# Patient Record
Sex: MALE | Race: WHITE | Employment: FULL TIME | URBAN - METROPOLITAN AREA
[De-identification: names, ages, dates, MRNs, and addresses within clinical notes are randomized per-mention and may not be internally consistent; named-entity substitution may affect disease eponyms.]

---

## 2018-08-03 ENCOUNTER — OFFICE VISIT (OUTPATIENT)
Dept: FAMILY MEDICINE CLINIC | Age: 23
End: 2018-08-03

## 2018-08-03 VITALS
HEART RATE: 67 BPM | TEMPERATURE: 96.6 F | WEIGHT: 288.1 LBS | SYSTOLIC BLOOD PRESSURE: 137 MMHG | BODY MASS INDEX: 35.08 KG/M2 | RESPIRATION RATE: 16 BRPM | DIASTOLIC BLOOD PRESSURE: 75 MMHG | OXYGEN SATURATION: 97 % | HEIGHT: 76 IN

## 2018-08-03 DIAGNOSIS — G43.109 MIGRAINE WITH AURA AND WITHOUT STATUS MIGRAINOSUS, NOT INTRACTABLE: Primary | ICD-10-CM

## 2018-08-03 DIAGNOSIS — H93.19 TINNITUS, UNSPECIFIED LATERALITY: ICD-10-CM

## 2018-08-03 DIAGNOSIS — H91.90 HEARING LOSS, UNSPECIFIED HEARING LOSS TYPE, UNSPECIFIED LATERALITY: ICD-10-CM

## 2018-08-03 PROBLEM — E66.01 SEVERE OBESITY (BMI 35.0-39.9): Status: ACTIVE | Noted: 2018-08-03

## 2018-08-03 RX ORDER — SUMATRIPTAN 50 MG/1
50 TABLET, FILM COATED ORAL
Qty: 10 TAB | Refills: 3 | Status: SHIPPED | OUTPATIENT
Start: 2018-08-03 | End: 2022-03-14 | Stop reason: SDUPTHER

## 2018-08-03 RX ORDER — NAPROXEN 500 MG/1
500 TABLET ORAL
Qty: 60 TAB | Refills: 3 | Status: SHIPPED | OUTPATIENT
Start: 2018-08-03 | End: 2022-03-14 | Stop reason: SDUPTHER

## 2018-08-03 NOTE — PROGRESS NOTES
[address] [place] [address] Visit   08/03/2018  Patient ID: Samir Hernandez is a 21 y.o. male. Assessment/Plan:      Diagnoses and all orders for this visit:    1. Migraine with aura and without status migrainosus, not intractable    2. Tinnitus, unspecified laterality  -     REFERRAL TO ENT-OTOLARYNGOLOGY    3. Hearing loss, unspecified hearing loss type, unspecified laterality  -     REFERRAL TO ENT-OTOLARYNGOLOGY    Other orders  -     naproxen (NAPROSYN) 500 mg tablet; Take 1 Tab by mouth every twelve (12) hours as needed. At first sign of migraine  -     SUMAtriptan (IMITREX) 50 mg tablet; Take 1 Tab by mouth daily as needed for Migraine. May repeat dose in 2 hours if symptoms not better. MAX in 24 hours: 200mg    Neuro exam and ear exam normal today. Start nsaid and triptan for prn use to manage occasional migraines. See ENT for hearing loss and tinnitus. See patient instructions for more. Counselled pt on Patient health concerns and plans. Patient was offered a choice/choices in the treatment plan today. Reviewed return precautions as appropriate. Patient expresses understanding of the plan and agrees with recommendations. Patient Instructions   . TODAY, please go to:  Release of records- [facility] student health- vaccine record     - prior PCP- all records    CHECK OUT - If you received a referral, Show the     Please schedule the following appointments at [facility] OUT:  · Follow up with Dr. Anca Ecsobar, rose Stephens Complete Physical Exam in Feb 2019      Today's Plan:  Take naprosyn then imitrex when needed  Avoid triggers. Call and schedule to see ENT  _____________________   Consider signing up for DepoMed to receive your results electronically. Subjective:   HPI:  Samir Hernandez is a 21 y.o. male being seen for:   Chief Complaint   Patient presents with    New Patient    Headache     History of Migraines started 1st year of high school.     Ringing in [facility] occasionally. Establish Care  Past medical history, surgical history, social history, family history, medications, allergies reviewed and updated. Prior PCP: does not remember name  Moved to the area from [place], [place]    Tinnitus  ·  usually only after very loud activities. First noticed in HS after pep rallies. Only in left ear. · Continues to recur after loud noises  · Lasts for minutes  · There is hearing loss during that time  · Temporary hearing loss about an hour a week ago on right  · There is some mild pain when he has tinnitus. 4-5/10 at worst  · No N/V.  · Does not balance problems when one ear has symptoms. Denies tinnitus  · Perhaps has this happen once a month or once every month. headaches  ·  started around summer freshman year of HS. Was playing video games, developed tunnel vision. · Starts with black spot in center of vision. Then the dark spot gets bigger, then headache usually frontal.   · No N/V. He does get sensitive to light and sound. · Headache is aching  · Improves with sleep after about 10 hours and then lingers for a day  · Triggered by dehydration, hunger, bright lights, physical activity, eye strain  · Used to be once every 6 months in college. Now about once a year. · Has tried ibuprofen 400mg w/o improvement, perhaps tylenol also. No syncope  Has had numbness on lateral left 5th digit after sleeping      Screening and Prevention Due:  Health Maintenance Due   Topic Date Due    DTaP/Tdap/Td series (1 - Tdap) 07/27/2016    Influenza Age 5 to Adult  08/01/2018         Review of Systems  Otherwise as noted in HPI    Social History     Social History Narrative    From [place], [place]     Family is [place]. He is middle son. Two biological brother and 4 step brothers. No Known Allergies  Prior to Admission medications    Medication Sig Start Date End Date Taking?  Authorizing Provider   naproxen (NAPROSYN) 500 mg tablet Take 1 Tab by mouth every twelve (12) hours as needed. At first sign of migraine 8/3/18  Yes Romy Sargent. Mariam Harris MD   SUMAtriptan (IMITREX) 50 mg tablet Take 1 Tab by mouth daily as needed for Migraine. May repeat dose in 2 hours if symptoms not better. MAX in 24 hours: 200mg 8/3/18  Yes Romy Sargent. Mariam Harris MD     Patient Active Problem List   Diagnosis Code    Severe obesity (BMI 35.0-39.9) ([facility] E66.01    Migraine with aura and without status migrainosus, not intractable G43.109     . Social History     Social History    Marital status: SINGLE     Spouse name: N/A    Number of children: N/A    Years of education: N/A     Occupational History     for [employer]      Social History Main Topics    Smoking status: Never Smoker    Smokeless tobacco: Never Used    Alcohol use 1.8 oz/week     3 Cans of beer per week    Drug use: No    Sexual activity: Yes     Partners: Female      Comment: monogamous     Other Topics Concern    Not on file     Social History Narrative    From [place] [facility]     Family is [facility] [employer]. He is middle son. Two biological brother and 4 step brothers. Past Medical History:   Diagnosis Date    Headache     Migraine with aura and without status migrainosus, not intractable 8/3/2018     History reviewed. No pertinent surgical history. Family History   Problem Relation Age of Onset    Depression Mother     Alcohol abuse Father     Depression Brother     Cancer Maternal Grandmother      Lung    No Known Problems Maternal Grandfather     No Known Problems Paternal Grandmother     No Known Problems Paternal Grandfather     No Known Problems Brother        ?   Objective:     Visit Vitals    /75 (BP 1 Location: Right arm, BP Patient Position: Sitting)    Pulse 67    Temp 96.6 °F (35.9 °C) (Oral)    Resp 16    Ht 6' 4\" (1.93 m)    Wt 288 lb 1.6 oz (130.7 kg)    SpO2 97%    BMI 35.07 kg/m2     Wt Readings from Last 3 Encounters:   08/03/18 288 lb 1.6 oz (130.7 kg)     BP Readings from Last 3 Encounters:   08/03/18 137/75     No flowsheet data found. Physical Exam   Constitutional: He appears well-developed and well-nourished. No distress. HENT:   Right Ear: Tympanic membrane and ear canal normal.   Left Ear: Tympanic membrane and ear canal normal.   Pulmonary/Chest: Effort normal. No respiratory distress. Neurological: He is alert. He has normal strength and normal reflexes. No cranial nerve deficit or sensory deficit. He exhibits normal muscle tone. He displays a negative Romberg sign. Coordination and gait normal. GCS eye subscore is 4. GCS verbal subscore is 5. GCS motor subscore is 6. Nl FTN, HTS, PAULETTE   Psychiatric: He has a normal mood and affect.  His behavior is normal.

## 2018-08-03 NOTE — MR AVS SNAPSHOT
[address] 
[address] [facility] [address] 
[phone] Patient: Kiya Sheikh MRN: [id number] :1995 Visit Information Date & Time Provider Department Dept. Phone Encounter #  
 8/3/2018  2:40 PM Marnie Goddard. Pb Ventura MD [facility] [phone] [id number] Upcoming Health Maintenance Date Due DTaP/Tdap/Td series (1 - Tdap) 2016 Influenza Age 5 to Adult 2018 Allergies as of 8/3/2018  Review Complete On: 8/3/2018 By: Marnie Goddard. Pb Ventura MD  
 No Known Allergies Current Immunizations  Never Reviewed No immunizations on file. Not reviewed this visit You Were Diagnosed With   
  
 Codes Comments Migraine with aura and without status migrainosus, not intractable    -  Primary ICD-10-CM: G43.109 ICD-9-CM: 346.00 Tinnitus, unspecified laterality     ICD-10-CM: H93.19 ICD-9-CM: 388.30 Hearing loss, unspecified hearing loss type, unspecified laterality     ICD-10-CM: H91.90 ICD-9-CM: 159. 9 Vitals BP Pulse Temp Resp Height(growth percentile) Weight(growth percentile)  
 137/75 (BP 1 Location: Right arm, BP Patient Position: Sitting) 67 96.6 °F (35.9 °C) (Oral) 16 6' 4\" (1.93 m) 288 lb 1.6 oz (130.7 kg) SpO2 BMI Smoking Status 97% 35.07 kg/m2 Never Smoker Vitals History BMI and BSA Data Body Mass Index Body Surface Area 35.07 kg/m 2 2.65 m 2 Preferred Pharmacy Pharmacy Name Phone [phone] Your Updated Medication List  
  
   
This list is accurate as of 8/3/18  4:08 PM.  Always use your most recent med list.  
  
  
  
  
 naproxen 500 mg tablet Commonly known as:  NAPROSYN Take 1 Tab by mouth every twelve (12) hours as needed. At first sign of migraine SUMAtriptan 50 mg tablet Commonly known as:  IMITREX Take 1 Tab by mouth daily as needed for Migraine. May repeat dose in 2 hours if symptoms not better. MAX in 24 hours: 200mg Prescriptions Sent to Pharmacy Refills  
 naproxen (NAPROSYN) 500 mg tablet 3 Sig: Take 1 Tab by mouth every twelve (12) hours as needed. At first sign of migraine Class: Normal  
 Pharmacy: [facility] [address] [place] [address], [address] [facility] Ph #: [phone] Route: Oral  
 SUMAtriptan (IMITREX) 50 mg tablet 3 Sig: Take 1 Tab by mouth daily as needed for Migraine. May repeat dose in 2 hours if symptoms not better. MAX in 24 hours: 200mg Class: Normal  
 Pharmacy: [facility] [address] [place] [address], [address] [facility] Ph #: [phone] Route: Oral  
  
We Performed the Following REFERRAL TO ENT-OTOLARYNGOLOGY [ORJ03 Custom] Comments:  
 Intermittent Tinnitus, hearing loss Referral Information Referral ID Referred By Referred To  
  
 [id number] [facility]   
   [address] [facility] [address] [place], [address] Visits Status Start Date End Date 1 New Request 8/3/18 8/3/19 If your referral has a status of pending review or denied, additional information will be sent to support the outcome of this decision. Patient Instructions Ana Gómez TODAY, please go to: 
Release of records- [facility] student health- vaccine record - prior PCP- all records CHECK OUT - If you received a referral, Show the  Please schedule the following appointments at [facility] OUT: 
· Follow up with Dr. Claudeen Figures, or Angie Complete Physical Exam in Feb 2019 Today's Plan: 
Take naprosyn then imitrex when needed Avoid triggers. Call and schedule to see ENT 
_____________________ Consider signing up for Dakim to receive your results electronically. Introducing [facility]!    
 Chari Hall introduces miiCard patient portal. Now you can access parts of your medical record, email your doctor's office, and request medication refills online. 1. In your internet browser, go to [email or web address] Identity Pass. [email or web address] Identity Pass 2. Click on the First Time User? Click Here link in the Sign In box. You will see the New Member Sign Up page. 3. Enter your Aposense Access Code exactly as it appears below. You will not need to use this code after youve completed the sign-up process. If you do not sign up before the expiration date, you must request a new code. · Aposense Access Code: [email or web address] Expires: 11/1/2018  4:08 PM 
 
4. Enter the last four digits of your Social Security Number (xxxx) and Date of Birth (mm/dd/yyyy) as indicated and click Submit. You will be taken to the next sign-up page. 5. Create a Aposense ID. This will be your Aposense login ID and cannot be changed, so think of one that is secure and easy to remember. 6. Create a Aposense password. You can change your password at any time. 7. Enter your Password Reset Question and Answer. This can be used at a later time if you forget your password. 8. Enter your e-mail address. You will receive e-mail notification when new information is available in [address] 9. Click Sign Up. You can now view and download portions of your medical record. 10. Click the Download Summary menu link to download a portable copy of your medical information. If you have questions, please visit the Frequently Asked Questions section of the Aposense website. Remember, Aposense is NOT to be used for urgent needs. For medical emergencies, dial 911. Now available from your iPhone and Android! Please provide this summary of care documentation to your next provider. Your primary care clinician is listed as Lizet Levin. If you have any questions after today's visit, please call [phone].

## 2018-08-03 NOTE — PROGRESS NOTES
Evelin Hayes  Identified pt with two pt identifiers(name and ). Chief Complaint   Patient presents with    New Patient    Headache     History of Migraines started 1st year of high school.  Ringing in Ear     Happens occasionally. 1. Have you been to the ER, urgent care clinic since your last visit? Hospitalized since your last visit? New patient    2. Have you seen or consulted any other health care providers outside of the [address] [place] [address] since your last visit? Include any pap smears or colon screening. New patient    Today's provider has been notified of reason for visit, vitals and flowsheets obtained on patients. Reviewed record In preparation for visit, huddled with provider and have obtained necessary documentation      Health Maintenance Due   Topic    DTaP/Tdap/Td series (1 - Tdap)    Influenza Age 5 to Adult        Wt Readings from Last 3 Encounters:   18 288 lb 1.6 oz (130.7 kg)     Temp Readings from Last 3 Encounters:   No data found for Temp     BP Readings from Last 3 Encounters:   No data found for BP     Pulse Readings from Last 3 Encounters:   No data found for Pulse     Vitals:    18 1503   Weight: 288 lb 1.6 oz (130.7 kg)   Height: 6' 4\" (1.93 m)   PainSc:   0 - No pain         Learning Assessment:  :     Learning Assessment 8/3/2018   PRIMARY LEARNER Patient   HIGHEST LEVEL OF EDUCATION - PRIMARY LEARNER  4 YEARS OF COLLEGE   BARRIERS PRIMARY LEARNER NONE   CO-LEARNER CAREGIVER No   PRIMARY LANGUAGE ENGLISH   LEARNER PREFERENCE PRIMARY READING   ANSWERED BY Patient   RELATIONSHIP SELF       Depression Screening:  :     No flowsheet data found. Fall Risk Assessment:  :     No flowsheet data found. Abuse Screening:  :     No flowsheet data found.     ADL Screening:  :     ADL Assessment 8/3/2018   Feeding yourself No Help Needed   Getting from bed to chair No Help Needed   Getting dressed No Help Needed   Bathing or showering No Help Needed   Walk across the room (includes cane/walker) No Help Needed   Using the telphone No Help Needed   Taking your medications No Help Needed   Preparing meals No Help Needed   Managing money (expenses/bills) No Help Needed   Moderately strenuous housework (laundry) No Help Needed   Shopping for personal items (toiletries/medicines) No Help Needed   Shopping for groceries No Help Needed   Driving No Help Needed   Climbing a flight of stairs No Help Needed   Getting to places beyond walking distances No Help Needed                 Medication reconciliation up to date and corrected with patient at this time.

## 2018-08-03 NOTE — PATIENT INSTRUCTIONS
April Lloyd TODAY, please go to:  Release of records- [facility] student health- vaccine record     - prior PCP- all records    CHECK OUT - If you received a referral, Show the     Please schedule the following appointments at [facility] OUT:  · Follow up with Dr. Ab Dillon, or Kat Complete Physical Exam in Feb 2019      Today's Plan:  Take naprosyn then imitrex when needed  Avoid triggers. Call and schedule to see ENT  _____________________   Consider signing up for Android App Review Sourcet to receive your results electronically.

## 2022-02-18 ENCOUNTER — OFFICE VISIT (OUTPATIENT)
Dept: INTERNAL MEDICINE CLINIC | Age: 27
End: 2022-02-18
Payer: COMMERCIAL

## 2022-02-18 VITALS
SYSTOLIC BLOOD PRESSURE: 114 MMHG | WEIGHT: 282.8 LBS | BODY MASS INDEX: 34.44 KG/M2 | OXYGEN SATURATION: 95 % | TEMPERATURE: 98.2 F | RESPIRATION RATE: 16 BRPM | DIASTOLIC BLOOD PRESSURE: 77 MMHG | HEIGHT: 76 IN | HEART RATE: 62 BPM

## 2022-02-18 DIAGNOSIS — R10.32 LEFT LOWER QUADRANT ABDOMINAL PAIN: ICD-10-CM

## 2022-02-18 DIAGNOSIS — M79.671 PAIN IN BOTH FEET: ICD-10-CM

## 2022-02-18 DIAGNOSIS — G43.109 MIGRAINE WITH AURA AND WITHOUT STATUS MIGRAINOSUS, NOT INTRACTABLE: ICD-10-CM

## 2022-02-18 DIAGNOSIS — F41.9 ANXIETY: ICD-10-CM

## 2022-02-18 DIAGNOSIS — Z00.00 PHYSICAL EXAM: Primary | ICD-10-CM

## 2022-02-18 DIAGNOSIS — R10.32 LEFT INGUINAL PAIN: ICD-10-CM

## 2022-02-18 DIAGNOSIS — E66.9 OBESITY (BMI 30.0-34.9): ICD-10-CM

## 2022-02-18 DIAGNOSIS — M79.672 PAIN IN BOTH FEET: ICD-10-CM

## 2022-02-18 PROCEDURE — 99385 PREV VISIT NEW AGE 18-39: CPT | Performed by: NURSE PRACTITIONER

## 2022-02-18 RX ORDER — SERTRALINE HYDROCHLORIDE 50 MG/1
50 TABLET, FILM COATED ORAL DAILY
Qty: 30 TABLET | Refills: 2 | Status: SHIPPED | OUTPATIENT
Start: 2022-02-18 | End: 2022-06-02

## 2022-02-18 RX ORDER — DICLOFENAC SODIUM 75 MG/1
TABLET, DELAYED RELEASE ORAL
COMMUNITY
Start: 2021-10-12 | End: 2022-03-15 | Stop reason: ALTCHOICE

## 2022-02-18 NOTE — PROGRESS NOTES
Subjective  Alena Katz is a 32 y.o. male presents to establish care   HPI     He has received COVID vaccine, booster and flu vaccine   Labs done by former PCP ~ 1.5 month ago; no abnormal findings  Pain left groin, left lower abdomen and down leg past couple of months. Diagnosed with sciatica by ortho and PT recommended before MRI. He has not started PT  No pain for a week    PCP evaluated for blood in urine and hernia, no abnormal finding   Back Xray order by PCP, normal   Pain random, non reproducible, not positional  No pain when he runs     Hx of migraine since HS. Saw ENT specialist d/t dizziness   Diagnosed with vestibular migraine   Dizziness builds and he gets migraine   Visual auras   Advised to take migraine medication when he has dizziness   Takes Naproxen, Ibuprofen and Imitrex with good relief   No neurology evaluation     Get pain bottom feet in last 6 months   Pain with standing and walking 30 minutes to 1 hour  No self treatment   Running does not cause pain     Psychosocial Hx:    since November   Blended family; 6 brothers   Anxiety \"neurotic\"   No prior treatment  for anxiety   Job is stressful   Works in [employer]  industry   No depression   Get 8 hours sleep  Occasional alcohol   No tobacco       Past Medical History:   Diagnosis Date    Headache     Migraine with aura and without status migrainosus, not intractable 8/3/2018       No Known Allergies     History reviewed. No pertinent surgical history. Current Outpatient Medications   Medication Sig    diclofenac EC (VOLTAREN) 75 mg EC tablet     sertraline (ZOLOFT) 50 mg tablet Take 1 Tablet by mouth daily.  naproxen (NAPROSYN) 500 mg tablet Take 1 Tab by mouth every twelve (12) hours as needed. At first sign of migraine    SUMAtriptan (IMITREX) 50 mg tablet Take 1 Tab by mouth daily as needed for Migraine. May repeat dose in 2 hours if symptoms not better.  MAX in 24 hours: 200mg     No current facility-administered medications for this visit.        Family History   Problem Relation Age of Onset    Depression Mother     Migraines Mother     Alcohol abuse Father     Depression Brother     Cancer Maternal Grandmother         Lung    No Known Problems Maternal Grandfather     No Known Problems Paternal Grandmother     No Known Problems Paternal Grandfather     No Known Problems Brother                  ROS    Objective  Physical Exam     Assessment & Plan  {ASSESSMENT/PLAN:99812}  Shanda Hogan, NP

## 2022-02-18 NOTE — PATIENT INSTRUCTIONS
Abdominal Pain: Care Instructions  Your Care Instructions     Abdominal pain has many possible causes. Some aren't serious and get better on their own in a few days. Others need more testing and treatment. If your pain continues or gets worse, you need to be rechecked and may need more tests to find out what is wrong. You may need surgery to correct the problem. Don't ignore new symptoms, such as fever, nausea and vomiting, urination problems, pain that gets worse, and dizziness. These may be signs of a more serious problem. Your doctor may have recommended a follow-up visit in the next 8 to 12 hours. If you are not getting better, you may need more tests or treatment. The doctor has checked you carefully, but problems can develop later. If you notice any problems or new symptoms, get medical treatment right away. Follow-up care is a key part of your treatment and safety. Be sure to make and go to all appointments, and call your doctor if you are having problems. It's also a good idea to know your test results and keep a list of the medicines you take. How can you care for yourself at home? · Rest until you feel better. · To prevent dehydration, drink plenty of fluids. Choose water and other clear liquids until you feel better. If you have kidney, heart, or liver disease and have to limit fluids, talk with your doctor before you increase the amount of fluids you drink. · If your stomach is upset, eat mild foods, such as rice, dry toast or crackers, bananas, and applesauce. Try eating several small meals instead of two or three large ones. · Wait until 48 hours after all symptoms have gone away before you have spicy foods, alcohol, and drinks that contain caffeine. · Do not eat foods that are high in fat. · Avoid anti-inflammatory medicines such as aspirin, ibuprofen (Advil, Motrin), and naproxen (Aleve). These can cause stomach upset.  Talk to your doctor if you take daily aspirin for another health problem. When should you call for help? Call 911 anytime you think you may need emergency care. For example, call if:    · You passed out (lost consciousness).     · You pass maroon or very bloody stools.     · You vomit blood or what looks like coffee grounds.     · You have new, severe belly pain. Call your doctor now or seek immediate medical care if:    · Your pain gets worse, especially if it becomes focused in one area of your belly.     · You have a new or higher fever.     · Your stools are black and look like tar, or they have streaks of blood.     · You have unexpected vaginal bleeding.     · You have symptoms of a urinary tract infection. These may include:  ? Pain when you urinate. ? Urinating more often than usual.  ? Blood in your urine.     · You are dizzy or lightheaded, or you feel like you may faint. Watch closely for changes in your health, and be sure to contact your doctor if:    · You are not getting better after 1 day (24 hours). Where can you learn more? Go to http://www.gray.com/  Enter Y477 in the search box to learn more about \"Abdominal Pain: Care Instructions. \"  Current as of: July 1, 2021               Content Version: 13.0  © 2006-2021 [employer]. Care instructions adapted under license by [employer] (which disclaims liability or warranty for this information). If you have questions about a medical condition or this instruction, always ask your healthcare professional. [place] [address] any warranty or liability for your use of this information. Body Mass Index: Care Instructions  Your Care Instructions     Body mass index (BMI) can help you see if your weight is raising your risk for health problems. It uses a formula to compare how much you weigh with how tall you are. · A BMI lower than 18.5 is considered underweight. · A BMI between 18.5 and 24.9 is considered healthy.   · A BMI between 25 and 29.9 is considered overweight. A BMI of 30 or higher is considered obese. If your BMI is in the normal range, it means that you have a lower risk for weight-related health problems. If your BMI is in the overweight or obese range, you may be at increased risk for weight-related health problems, such as high blood pressure, heart disease, stroke, arthritis or joint pain, and diabetes. If your BMI is in the underweight range, you may be at increased risk for health problems such as fatigue, lower protection (immunity) against illness, muscle loss, bone loss, hair loss, and hormone problems. BMI is just one measure of your risk for weight-related health problems. You may be at higher risk for health problems if you are not active, you eat an unhealthy diet, or you drink too much alcohol or use tobacco products. Follow-up care is a key part of your treatment and safety. Be sure to make and go to all appointments, and call your doctor if you are having problems. It's also a good idea to know your test results and keep a list of the medicines you take. How can you care for yourself at home? · Practice healthy eating habits. This includes eating plenty of fruits, vegetables, whole grains, lean protein, and low-fat dairy. · If your doctor recommends it, get more exercise. Walking is a good choice. Bit by bit, increase the amount you walk every day. Try for at least 30 minutes on most days of the week. · Do not smoke. Smoking can increase your risk for health problems. If you need help quitting, talk to your doctor about stop-smoking programs and medicines. These can increase your chances of quitting for good. · Limit alcohol to 2 drinks a day for men and 1 drink a day for women. Too much alcohol can cause health problems. If you have a BMI higher than 25  · Your doctor may do other tests to check your risk for weight-related health problems.  This may include measuring the distance around your waist. A waist measurement of more than 40 inches in men or 35 inches in women can increase the risk of weight-related health problems. · Talk with your doctor about steps you can take to stay healthy or improve your health. You may need to make lifestyle changes to lose weight and stay healthy, such as changing your diet and getting regular exercise. If you have a BMI lower than 18.5  · Your doctor may do other tests to check your risk for health problems. · Talk with your doctor about steps you can take to stay healthy or improve your health. You may need to make lifestyle changes to gain or maintain weight and stay healthy, such as getting more healthy foods in your diet and doing exercises to build muscle. Where can you learn more? Go to http://www.hurt.com/  Enter S176 in the search box to learn more about \"Body Mass Index: Care Instructions. \"  Current as of: March 17, 2021               Content Version: 13.0  © 0322-9063 [employer]. Care instructions adapted under license by [employer] (which disclaims liability or warranty for this information). If you have questions about a medical condition or this instruction, always ask your healthcare professional. [address] any warranty or liability for your use of this information. Learning About Low-Carbohydrate Diets  What is a low-carbohydrate diet? A low-carbohydrate (or \"low-carb\") diet limits foods and drinks that have carbohydrates. This includes grains, fruits, milk and yogurt, and starchy vegetables like potatoes, beans, and corn. It also avoids foods and drinks that have added sugar. Instead, low-carb diets include foods that are high in protein and fat. Why might you follow a low-carb diet? Low-carb diets may be used for a variety of reasons, such as for weight loss. People who have diabetes may use a low-carb diet to help manage their blood sugar levels.   What should you do before you start the diet? Talk to your doctor before you try any diet. This is even more important if you have health problems like kidney disease, heart disease, or diabetes. Your doctor may suggest that you meet with a registered dietitian. A dietitian can help you make an eating plan that works for you. What foods do you eat on a low-carb diet? On a low-carb diet, you choose foods that are high in protein and fat. Examples of these are:  · Meat, poultry, and fish. · Eggs. · Nuts, such as walnuts, pecans, almonds, and peanuts. · Peanut butter and other nut butters. · Tofu. · Avocado. · Rosalynd Peer. · Non-starchy vegetables like broccoli, cauliflower, green beans, mushrooms, peppers, lettuce, and spinach. · Unsweetened non-dairy milks like almond milk and coconut milk. · Cheese, cottage cheese, and cream cheese. Current as of: December 17, 2020               Content Version: 13.0  © 2006-2021 [employer]. Care instructions adapted under license by [employer] (which disclaims liability or warranty for this information). If you have questions about a medical condition or this instruction, always ask your healthcare professional. [place] [address] any warranty or liability for your use of this information. Recurring Migraine Headache: Care Instructions  Overview  Migraines are painful, throbbing headaches. They often start on one side of the head. They may cause nausea and vomiting and make you sensitive to light, sound, or smell. Some people may have only a few migraines throughout life. Others have them as often as several times a month. The goal of treatment is to reduce the number of migraines you have and relieve your symptoms. Even with treatment, you may continue to have migraines. You play an important role in dealing with your headaches. Work on avoiding things that seem to trigger your migraines.  When you feel a headache coming on, act quickly to stop it before it gets worse. Follow-up care is a key part of your treatment and safety. Be sure to make and go to all appointments, and call your doctor if you are having problems. It's also a good idea to know your test results and keep a list of the medicines you take. How can you care for yourself at home? · Do not drive if you have taken a prescription pain medicine. · Rest in a quiet, dark room until your headache is gone. Close your eyes and try to relax or go to sleep. Do not watch TV or read. · Put a cold, moist cloth or cold pack on the painful area for 10 to 20 minutes at a time. Put a thin cloth between the cold pack and your skin. · Have someone gently massage your neck and shoulders. · Take your medicines exactly as prescribed. Call your doctor if you think you are having a problem with your medicine. You will get more details on the specific medicines your doctor prescribes. · Don't take medicine for headache pain too often. Talk to your doctor if you are taking medicine more than 2 days a week to stop a headache. Taking too much pain medicine can lead to more headaches. These are called medicine-overuse headaches. To prevent migraines  · Keep a headache diary so you can figure out what triggers your headaches. Avoiding triggers may help you prevent headaches. Record when each headache began, how long it lasted, and what the pain was like. Write down any other symptoms you had with the headache. These may include nausea, flashing lights or dark spots, or sensitivity to bright light or loud noise. Note if the headache occurred near your period. List anything that might have triggered the headache. Triggers may include certain foods (chocolate, cheese, wine) or odors, smoke, bright light, stress, or lack of sleep. · If your doctor has prescribed medicine for your migraines, take it as directed.  You may have medicine that you take only when you get a migraine and medicine that you take all the time to help prevent migraines. ? If your doctor has prescribed medicine for when you get a headache, take it at the first sign of a migraine, unless your doctor has given you other instructions. ? If your doctor has prescribed medicine to prevent migraines, take it exactly as prescribed. Call your doctor if you think you are having a problem with your medicine. · Find healthy ways to deal with stress. Migraines are most common during or right after stressful times. Try finding ways to reduce stress like practicing mindfulness or deep breathing exercises. · Get regular sleep and exercise. But be careful to not push yourself too hard during exercise. It may trigger a headache. · Eat regular meals, and avoid foods and drinks that often trigger migraines. These include chocolate and alcohol, especially red wine and port. Chemicals used in food, such as aspartame and monosodium glutamate (MSG), also can trigger migraines. So can some food additives, such as those found in hot dogs, gleason, cold cuts, aged cheeses, and pickled foods. · Limit caffeine by not drinking too much coffee, tea, or soda. Do not quit caffeine suddenly, because that can also give you migraines. · Do not smoke or allow others to smoke around you. If you need help quitting, talk to your doctor about stop-smoking programs and medicines. These can increase your chances of quitting for good. · If you are taking birth control pills or hormone therapy, talk to your doctor about whether they are triggering your migraines. When should you call for help? Call 911 anytime you think you may need emergency care. For example, call if:    · You have symptoms of a stroke. These may include:  ? Sudden numbness, tingling, weakness, or loss of movement in your face, arm, or leg, especially on only one side of your body. ? Sudden vision changes. ? Sudden trouble speaking. ? Sudden confusion or trouble understanding simple statements.   ? Sudden problems with walking or balance. ? A sudden, severe headache that is different from past headaches. Call your doctor now or seek immediate medical care if:    · You develop a fever and a stiff neck.     · You have new nausea and vomiting, or you cannot keep down food or liquids. Watch closely for changes in your health, and be sure to contact your doctor if:    · You have a headache that does not get better within 1 or 2 days.     · Your headaches get worse or happen more often. Where can you learn more? Go to http://www.gray.com/  Enter V975 in the search box to learn more about \"Recurring Migraine Headache: Care Instructions. \"  Current as of: April 8, 2021               Content Version: 13.0  © 3328-9103 [employer]. Care instructions adapted under license by [employer] (which disclaims liability or warranty for this information). If you have questions about a medical condition or this instruction, always ask your healthcare professional. [place] [address] any warranty or liability for your use of this information. Well Visit, Ages 25 to 48: Care Instructions  Overview     Well visits can help you stay healthy. Your doctor has checked your overall health and may have suggested ways to take good care of yourself. Your doctor also may have recommended tests. At home, you can help prevent illness with healthy eating, regular exercise, and other steps. Follow-up care is a key part of your treatment and safety. Be sure to make and go to all appointments, and call your doctor if you are having problems. It's also a good idea to know your test results and keep a list of the medicines you take. How can you care for yourself at home? · Get screening tests that you and your doctor decide on. Screening helps find diseases before any symptoms appear. · Eat healthy foods. Choose fruits, vegetables, whole grains, protein, and low-fat dairy foods. Limit fat, especially saturated fat. Reduce salt in your diet. · Limit alcohol. If you are a man, have no more than 2 drinks a day or 14 drinks a week. If you are a woman, have no more than 1 drink a day or 7 drinks a week. · Get at least 30 minutes of physical activity on most days of the week. Walking is a good choice. You also may want to do other activities, such as running, swimming, cycling, or playing tennis or team sports. Discuss any changes in your exercise program with your doctor. · Reach and stay at a healthy weight. This will lower your risk for many problems, such as obesity, diabetes, heart disease, and high blood pressure. · Do not smoke or allow others to smoke around you. If you need help quitting, talk to your doctor about stop-smoking programs and medicines. These can increase your chances of quitting for good. · Care for your mental health. It is easy to get weighed down by worry and stress. Learn strategies to manage stress, like deep breathing and mindfulness, and stay connected with your family and community. If you find you often feel sad or hopeless, talk with your doctor. Treatment can help. · Talk to your doctor about whether you have any risk factors for sexually transmitted infections (STIs). You can help prevent STIs if you wait to have sex with a new partner (or partners) until you've each been tested for STIs. It also helps if you use condoms (male or female condoms) and if you limit your sex partners to one person who only has sex with you. Vaccines are available for some STIs, such as HPV. · Use birth control if it's important to you to prevent pregnancy. Talk with your doctor about the choices available and what might be best for you. · If you think you may have a problem with alcohol or drug use, talk to your doctor. This includes prescription medicines (such as amphetamines and opioids) and illegal drugs (such as cocaine and methamphetamine).  Your doctor can help you figure out what type of treatment is best for you. · Protect your skin from too much sun. When you're outdoors from 10 a.m. to 4 p.m., stay in the shade or cover up with clothing and a hat with a wide brim. Wear sunglasses that block UV rays. Even when it's cloudy, put broad-spectrum sunscreen (SPF 30 or higher) on any exposed skin. · See a dentist one or two times a year for checkups and to have your teeth cleaned. · Wear a seat belt in the car. When should you call for help? Watch closely for changes in your health, and be sure to contact your doctor if you have any problems or symptoms that concern you. Where can you learn more? Go to http://www.hurt.com/  Enter P072 in the search box to learn more about \"Well Visit, Ages 25 to 48: Care Instructions. \"  Current as of: February 11, 2021               Content Version: 13.0  © 2006-2021 [employer]. Care instructions adapted under license by [employer] (which disclaims liability or warranty for this information). If you have questions about a medical condition or this instruction, always ask your healthcare professional. [place] [address] any warranty or liability for your use of this information. Anxiety Disorder: Care Instructions  Your Care Instructions     Anxiety is a normal reaction to stress. Difficult situations can cause you to have symptoms such as sweaty palms and a nervous feeling. In an anxiety disorder, the symptoms are far more severe. Constant worry, muscle tension, trouble sleeping, nausea and diarrhea, and other symptoms can make normal daily activities difficult or impossible. These symptoms may occur for no reason, and they can affect your work, school, or social life. Medicines, counseling, and self-care can all help. Follow-up care is a key part of your treatment and safety.  Be sure to make and go to all appointments, and call your doctor if you are having problems. It's also a good idea to know your test results and keep a list of the medicines you take. How can you care for yourself at home? · Take medicines exactly as directed. Call your doctor if you think you are having a problem with your medicine. · Go to your counseling sessions and follow-up appointments. · Recognize and accept your anxiety. Then, when you are in a situation that makes you anxious, say to yourself, \"This is not an emergency. I feel uncomfortable, but I am not in danger. I can keep going even if I feel anxious. \"  · Be kind to your body:  ? Relieve tension with exercise or a massage. ? Get enough rest.  ? Avoid alcohol, caffeine, nicotine, and illegal drugs. They can increase your anxiety level and cause sleep problems. ? Learn and do relaxation techniques. See below for more about these techniques. · Engage your mind. Get out and do something you enjoy. Go to a funny movie, or take a walk or hike. Plan your day. Having too much or too little to do can make you anxious. · Keep a record of your symptoms. Discuss your fears with a good friend or family member, or join a support group for people with similar problems. Talking to others sometimes relieves stress. · Get involved in social groups, or volunteer to help others. Being alone sometimes makes things seem worse than they are. · Get at least 30 minutes of exercise on most days of the week to relieve stress. Walking is a good choice. You also may want to do other activities, such as running, swimming, cycling, or playing tennis or team sports. Relaxation techniques  Do relaxation exercises 10 to 20 minutes a day. You can play soothing, relaxing music while you do them, if you wish. · Tell others in your house that you are going to do your relaxation exercises. Ask them not to disturb you. · Find a comfortable place, away from all distractions and noise. · Lie down on your back, or sit with your back straight.   · Focus on your breathing. Make it slow and steady. · Breathe in through your nose. Breathe out through either your nose or mouth. · Breathe deeply, filling up the area between your navel and your rib cage. Breathe so that your belly goes up and down. · Do not hold your breath. · Breathe like this for 5 to 10 minutes. Notice the feeling of calmness throughout your whole body. As you continue to breathe slowly and deeply, relax by doing the following for another 5 to 10 minutes:  · Tighten and relax each muscle group in your body. You can begin at your toes and work your way up to your head. · Imagine your muscle groups relaxing and becoming heavy. · Empty your mind of all thoughts. · Let yourself relax more and more deeply. · Become aware of the state of calmness that surrounds you. · When your relaxation time is over, you can bring yourself back to alertness by moving your fingers and toes and then your hands and feet and then stretching and moving your entire body. Sometimes people fall asleep during relaxation, but they usually wake up shortly afterward. · Always give yourself time to return to full alertness before you drive a car or do anything that might cause an accident if you are not fully alert. Never play a relaxation tape while you drive a car. When should you call for help? Call 911 anytime you think you may need emergency care. For example, call if:    · You feel you cannot stop from hurting yourself or someone else. Keep the numbers for these national suicide hotlines: 9-999-850-TALK (6-108.993.4015) and 0-596-TVFIQMC (5-150.368.7435). If you or someone you know talks about suicide or feeling hopeless, get help right away. Watch closely for changes in your health, and be sure to contact your doctor if:    · You have anxiety or fear that affects your life.     · You have symptoms of anxiety that are new or different from those you had before. Where can you learn more?   Go to http://www.gray.com/  Enter P754 in the search box to learn more about \"Anxiety Disorder: Care Instructions. \"  Current as of: June 16, 2021               Content Version: 13.0  © 9493-4887 [employer]. Care instructions adapted under license by [employer] (which disclaims liability or warranty for this information). If you have questions about a medical condition or this instruction, always ask your healthcare professional. [place] [address] any warranty or liability for your use of this information. Learning About Anxiety Disorders  What are anxiety disorders? Anxiety disorders are a type of medical problem. They cause severe anxiety. When you feel anxious, you feel that something bad is about to happen. This feeling interferes with your life. These disorders include:  · Generalized anxiety disorder. You feel worried and stressed about many everyday events and activities. This goes on for several months and disrupts your life on most days. · Panic disorder. You have repeated panic attacks. A panic attack is a sudden, intense fear or anxiety. It may make you feel short of breath. Your heart may pound. · Social anxiety disorder. You feel very anxious about what you will say or do in front of people. For example, you may be scared to talk or eat in public. This problem affects your daily life. · Phobias. You are very scared of a specific object, situation, or activity. For example, you may fear spiders, high places, or small spaces. What are the symptoms? Generalized anxiety disorder  Symptoms may include:  · Feeling worried and stressed about many things almost every day. · Feeling tired or irritable. You may have a hard time concentrating. · Having headaches or muscle aches. · Having a hard time getting to sleep or staying asleep. Panic disorder  You may have repeated panic attacks when there is no reason for feeling afraid. You may change your daily activities because you worry that you will have another attack. Symptoms may include:  · Intense fear, terror, or anxiety. · Trouble breathing or very fast breathing. · Chest pain or tightness. · A heartbeat that races or is not regular. Social anxiety disorder  Symptoms may include:  · Fear about a social situation, such as eating in front of others or speaking in public. You may worry a lot. Or you may be afraid that something bad will happen. · Anxiety that can cause you to blush, sweat, and feel shaky. · A heartbeat that is faster than normal.  · A hard time focusing. Phobias  Symptoms may include:  · More fear than most people of being around an object, being in a situation, or doing an activity. You might also be stressed about the chance of being around the thing you fear. · Worry about losing control, panicking, fainting, or having physical symptoms like a faster heartbeat when you are around the situation or object. How are these disorders treated? Anxiety disorders can be treated with medicines or counseling. A combination of both may be used. Medicines may include:  · Antidepressants. These may help your symptoms by keeping chemicals in your brain in balance. · Benzodiazepines. These may give you short-term relief of your symptoms. Some people use cognitive-behavioral therapy. A therapist helps you learn to change stressful or bad thoughts into helpful thoughts. Lead a healthy lifestyle  A healthy lifestyle may help you feel better. · Get at least 30 minutes of exercise on most days of the week. Walking is a good choice. · Eat a healthy diet. Include fruits, vegetables, lean proteins, and whole grains in your diet each day. · Try to go to bed at the same time every night. Try for 8 hours of sleep a night. · Find ways to manage stress. Try relaxation exercises. · Avoid alcohol and illegal drugs. Follow-up care is a key part of your treatment and safety.  Be sure to make and go to all appointments, and call your doctor if you are having problems. It's also a good idea to know your test results and keep a list of the medicines you take. Where can you learn more? Go to http://www.gray.com/  Enter X013 in the search box to learn more about \"Learning About Anxiety Disorders. \"  Current as of: June 16, 2021               Content Version: 13.0  © 2006-2021 Healthwise, Incorporated. Care instructions adapted under license by [employer] (which disclaims liability or warranty for this information). If you have questions about a medical condition or this instruction, always ask your healthcare professional. [address] any warranty or liability for your use of this information.

## 2022-02-18 NOTE — PROGRESS NOTES
Rm 7    Chief Complaint   Patient presents with   [address]     pt is fasting   Pt has had both covid and flu vaccine. 1. Have you been to the ER, urgent care clinic since your last visit? Hospitalized since your last visit? No    2. Have you seen or consulted any other health care providers outside of the [address] [place] [address] [facility] since your last visit? Include any pap smears or colon screening. No        Health Maintenance Due   Topic Date Due    Hepatitis C Screening  Never done    Depression Screen  Never done    COVID-19 Vaccine (1) Never done    DTaP/Tdap/Td series (1 - Tdap) Never done    Flu Vaccine (1) Never done           3 most recent PHQ Screens 2/18/2022   Little interest or pleasure in doing things Not at all   Feeling down, depressed, irritable, or hopeless Not at all   Total Score PHQ 2 0           Learning Assessment 2/18/2022   PRIMARY LEARNER Patient   HIGHEST LEVEL OF EDUCATION - PRIMARY LEARNER  4 YEARS OF COLLEGE   BARRIERS PRIMARY LEARNER NONE   CO-LEARNER CAREGIVER No   PRIMARY LANGUAGE ENGLISH   LEARNER PREFERENCE PRIMARY READING   ANSWERED BY patient   RELATIONSHIP SELF         An After Visit Summary was printed and given to the patient.

## 2022-02-18 NOTE — PROGRESS NOTES
Subjective  Michelle Watts is a 32 y.o. male. HPI      Rena Watts is a 32 y.o. male presents to establish care   HPI     He has received COVID vaccine, booster and flu vaccine   Labs done by former PCP ~ 1.5 month ago; no abnormal findings  Pain left groin, left lower abdomen and down leg past couple of months. Diagnosed with sciatica by ortho. PT recommended before MRI. He has not started PT  No pain for a week    PCP evaluated for blood in urine and hernia, no abnormal finding   Back Xray order by PCP, normal   Pain random, non reproducible, not positional  No pain when he runs     Hx of migraine since HS. Saw ENT specialist d/t dizziness   Diagnosed with vestibular migraine   Dizziness builds and he gets migraine   Visual auras   Advised to take migraine medication when he has dizziness   Takes Naproxen, Ibuprofen and Imitrex with good relief   No neurology evaluation     Get pain bottom feet in last 6 months   Pain with standing and walking 30 minutes to 1 hour  No self treatment   Running does not cause pain     Psychosocial Hx:    since November   Blended family; 6 brothers   Anxiety \"neurotic\"   No prior treatment  for anxiety   Job is stressful   Works in telecommunication  industry   No depression   Get 8 hours sleep  Occasional alcohol   No tobacco       Past Medical History:   Diagnosis Date    Headache     Migraine with aura and without status migrainosus, not intractable 8/3/2018       No Known Allergies     History reviewed. No pertinent surgical history. Current Outpatient Medications on File Prior to Visit   Medication Sig Dispense Refill    diclofenac EC (VOLTAREN) 75 mg EC tablet       naproxen (NAPROSYN) 500 mg tablet Take 1 Tab by mouth every twelve (12) hours as needed. At first sign of migraine 60 Tab 3    SUMAtriptan (IMITREX) 50 mg tablet Take 1 Tab by mouth daily as needed for Migraine. May repeat dose in 2 hours if symptoms not better. MAX in 24 hours: 200mg 10 Tab 3     No current facility-administered medications on file prior to visit. Family History   Problem Relation Age of Onset    Depression Mother    Claudia Sahu Migraines Mother     Alcohol abuse Father     Depression Brother     Cancer Maternal Grandmother         Lung    No Known Problems Maternal Grandfather     No Known Problems Paternal Grandmother     No Known Problems Paternal Grandfather     No Known Problems Brother      Review of Systems   Constitutional: Negative for chills, fever and malaise/fatigue. HENT: Negative. Eyes: Negative. Wears glasses    Respiratory: Negative. Cardiovascular: Negative. Gastrointestinal: Positive for abdominal pain. Negative for blood in stool, constipation, diarrhea, heartburn, nausea and vomiting. Genitourinary: Negative for dysuria and urgency. Musculoskeletal: Positive for myalgias. Negative for falls and neck pain. Skin: Negative for itching and rash. Neurological: Positive for headaches. Negative for sensory change and weakness. Psychiatric/Behavioral: Negative for depression. The patient is nervous/anxious. The patient does not have insomnia. Objective  Visit Vitals  /77 (BP 1 Location: Left upper arm, BP Patient Position: Sitting, BP Cuff Size: Adult long)   Pulse 62   Temp 98.2 °F (36.8 °C) (Oral)   Resp 16   Ht 6' 4\" (1.93 m)   Wt 282 lb 12.8 oz (128.3 kg)   SpO2 95%   BMI 34.42 kg/m²     Physical Exam  Constitutional:       General: He is not in acute distress. Appearance: Normal appearance. He is not ill-appearing. HENT:      Head: Normocephalic and atraumatic. Nose: No congestion or rhinorrhea. Eyes:      Conjunctiva/sclera: Conjunctivae normal.   Neck:      Thyroid: No thyromegaly or thyroid tenderness. Cardiovascular:      Rate and Rhythm: Normal rate and regular rhythm. Pulses: Normal pulses. Heart sounds: Normal heart sounds.    Pulmonary:      Effort: Pulmonary effort is normal.      Breath sounds: Normal breath sounds. Abdominal:      Palpations: There is no mass. Tenderness: There is no abdominal tenderness. There is no right CVA tenderness or left CVA tenderness. Hernia: No hernia is present. Musculoskeletal:         General: No swelling or tenderness. Cervical back: Normal range of motion. Right hip: Normal. No bony tenderness. Normal range of motion. Left hip: Normal. No bony tenderness. Normal range of motion. Right lower leg: No edema. Left lower leg: No edema. Lymphadenopathy:      Cervical: No cervical adenopathy. Skin:     General: Skin is warm and dry. Neurological:      Mental Status: He is alert. Mental status is at baseline. Cranial Nerves: Cranial nerves are intact. Motor: Motor function is intact. Gait: Gait normal.   Psychiatric:         Attention and Perception: Attention normal.         Mood and Affect: Mood is anxious. Speech: Speech normal.         Behavior: Behavior is cooperative. Thought Content: Thought content normal.         Cognition and Memory: Cognition normal.          Assessment & Plan    ICD-10-CM ICD-9-CM    1. Physical exam  Z00.00 V70.9    2. Left lower quadrant abdominal pain  R10.32 789.04 US ABD COMP   3. Left inguinal pain  R10.32 789.04 XR HIP LT W OR WO PELV 2-3 VWS      US ABD COMP   4. Anxiety  F41.9 300.00 sertraline (ZOLOFT) 50 mg tablet   5. Pain in both feet  M79.671 729.5     M79.672     6. Migraine with aura and without status migrainosus, not intractable  G43.109 346.00 REFERRAL TO NEUROLOGY   7. Obesity (BMI 30.0-34. 9)  E66.9 278.00      Follow-up and Dispositions    · Return in about 4 weeks (around 3/18/2022) for anxiety. 2,Discussed management plan, indications for imaging   reviewed diet, exercise and weight control  4. Discussed management options.  He elects medical management     reviewed medications and side effects in detail  Radha SULLIVAN Bertha Tim, NP

## 2022-02-25 ENCOUNTER — HOSPITAL ENCOUNTER (OUTPATIENT)
Dept: ULTRASOUND IMAGING | Age: 27
Discharge: HOME | End: 2022-02-25

## 2022-02-25 ENCOUNTER — HOSPITAL ENCOUNTER (OUTPATIENT)
Dept: GENERAL RADIOLOGY | Age: 27
Discharge: HOME | End: 2022-02-25
Payer: COMMERCIAL

## 2022-02-25 DIAGNOSIS — R10.32 LEFT INGUINAL PAIN: ICD-10-CM

## 2022-02-25 DIAGNOSIS — R10.32 LEFT LOWER QUADRANT ABDOMINAL PAIN: ICD-10-CM

## 2022-02-25 PROCEDURE — 73502 X-RAY EXAM HIP UNI 2-3 VIEWS: CPT | Performed by: NURSE PRACTITIONER

## 2022-02-25 PROCEDURE — 76700 US EXAM ABDOM COMPLETE: CPT | Performed by: NURSE PRACTITIONER

## 2022-03-01 DIAGNOSIS — M21.852 HIP DYSPLASIA, ACQUIRED, LEFT: Primary | ICD-10-CM

## 2022-03-13 ENCOUNTER — PATIENT MESSAGE (OUTPATIENT)
Dept: INTERNAL MEDICINE CLINIC | Age: 27
End: 2022-03-13

## 2022-03-14 NOTE — TELEPHONE ENCOUNTER
From: Laura Ko  To: Sheeba Miller NP  Sent: 3/13/2022 5:23 PM EDT  Subject: Refill for prescription    Hello Dr Larry Hernandez,    Can you renew my prescription for naproxen and sumatriptan? I was prescribed these to help with my migraines.

## 2022-03-15 RX ORDER — SUMATRIPTAN 50 MG/1
50 TABLET, FILM COATED ORAL
Qty: 10 TABLET | Refills: 3 | Status: SHIPPED | OUTPATIENT
Start: 2022-03-15

## 2022-03-15 RX ORDER — NAPROXEN 500 MG/1
500 TABLET ORAL
Qty: 60 TABLET | Refills: 0 | Status: SHIPPED | OUTPATIENT
Start: 2022-03-15 | End: 2022-07-14 | Stop reason: SDUPTHER

## 2022-03-15 NOTE — TELEPHONE ENCOUNTER
Last visit 02/18/2022 [facility]SE[facility] Suarez  Next appointment 4 weeks (03/2022) - Nothing scheduled   Previous refill encounter(s)   08/03/2018:  - Naprosyn #60 with 3 refills,  - Imitrex #10 with 3 refills. Requested Prescriptions     Pending Prescriptions Disp Refills    naproxen (NAPROSYN) 500 mg tablet 60 Tablet 3     Sig: Take 1 Tablet by mouth every twelve (12) hours as needed. At first sign of migraine    SUMAtriptan (IMITREX) 50 mg tablet 10 Tablet 3     Sig: Take 1 Tablet by mouth daily as needed for Migraine. May repeat dose in 2 hours if symptoms not better.  MAX in 24 hours: 200mg

## 2022-03-19 PROBLEM — G43.109 MIGRAINE WITH AURA AND WITHOUT STATUS MIGRAINOSUS, NOT INTRACTABLE: Status: ACTIVE | Noted: 2018-08-03

## 2022-03-24 ENCOUNTER — OFFICE VISIT (OUTPATIENT)
Dept: ORTHOPEDIC SURGERY | Age: 27
End: 2022-03-24
Payer: COMMERCIAL

## 2022-03-24 VITALS — BODY MASS INDEX: 34.7 KG/M2 | HEIGHT: 76 IN | WEIGHT: 285 LBS

## 2022-03-24 DIAGNOSIS — M25.752 FEMOROACETABULAR IMPINGEMENT WITH OSTEOPHYTE OF LEFT HIP: Primary | ICD-10-CM

## 2022-03-24 DIAGNOSIS — M25.552 LEFT HIP PAIN: ICD-10-CM

## 2022-03-24 DIAGNOSIS — M25.852 FEMOROACETABULAR IMPINGEMENT WITH OSTEOPHYTE OF LEFT HIP: Primary | ICD-10-CM

## 2022-03-24 PROCEDURE — 99203 OFFICE O/P NEW LOW 30 MIN: CPT | Performed by: ORTHOPAEDIC SURGERY

## 2022-03-24 NOTE — LETTER
3/24/2022    Patient: Marva Kelly   YOB: 1995   Date of Visit: 3/24/2022     Sil Vegas. Ania Winter, [address]  [address]  [facility] [address]  Via Fax: [phone]    Dear Sil Vegas. Ania Winter MD,      Thank you for referring Mr. Marva Kelly to [facility] for evaluation. My notes for this consultation are attached. If you have questions, please do not hesitate to call me. I look forward to following your patient along with you.       Sincerely,    Ana Lilia Lopez MD

## 2022-03-24 NOTE — PROGRESS NOTES
Abigail Hernandez (: 1995) is a 32 y.o. male, patient, here for evaluation of the following chief complaint(s):  Hip Pain (left hip pain )       HPI:    Left hip pain. Pain when he either stands or is upright for a long period of time. Does not complain of the usual pain when he is seated or going into deep hip flex activities. He did see his PCP and mention this and x-rays were obtained I did read the report. No Known Allergies    Current Outpatient Medications   Medication Sig    naproxen (NAPROSYN) 500 mg tablet Take 1 Tablet by mouth every twelve (12) hours as needed for Pain. At first sign of migraine    SUMAtriptan (IMITREX) 50 mg tablet Take 1 Tablet by mouth daily as needed for Migraine. May repeat dose in 2 hours if symptoms not better. MAX in 24 hours: 200mg    sertraline (ZOLOFT) 50 mg tablet Take 1 Tablet by mouth daily. No current facility-administered medications for this visit. Past Medical History:   Diagnosis Date    Headache     Migraine with aura and without status migrainosus, not intractable 8/3/2018        No past surgical history on file. Family History   Problem Relation Age of Onset    Depression Mother     Migraines Mother     Alcohol abuse Father     Depression Brother     Cancer Maternal Grandmother         Lung    No Known Problems Maternal Grandfather     No Known Problems Paternal Grandmother     No Known Problems Paternal Grandfather     No Known Problems Brother         Social History     Socioeconomic History    Marital status:      Spouse name: Not on file    Number of children: Not on file    Years of education: Not on file    Highest education level: Not on file   Occupational History    Occupation:  for [employer]   Tobacco Use    Smoking status: Never Smoker    Smokeless tobacco: Never Used   Substance and Sexual Activity    Alcohol use:  Yes     Alcohol/week: 3.0 standard drinks     Types: 3 Patient was notified that their INR result was within therapeutic range. Patient was instructed to continue taking their Coumadin/Warfarin as follows: 7 mg every day of the week.   Next INR is due in 3 weeks on 3/21/19.  Patient was instructed to contact us with any unusual bleeding, bruising, any changes in medications, diet or health status and if there are any other questions or concerns.   Patient verbalized understanding of above.         Cans of beer per week     Comment: occasional    Drug use: No    Sexual activity: Yes     Partners: Female     Comment: monogamous   Other Topics Concern    Not on file   Social History Narrative    From [place], [place]     Family is [place]. He is middle son. Two biological brother and 4 step brothers. Social Determinants of Health     Financial Resource Strain:     Difficulty of Paying Living Expenses: Not on file   Food Insecurity:     Worried About Running Out of Food in the Last Year: Not on file    Janine of Food in the Last Year: Not on file   Transportation Needs:     Lack of Transportation (Medical): Not on file    Lack of Transportation (Non-Medical): Not on file   Physical Activity:     Days of Exercise per Week: Not on file    Minutes of Exercise per Session: Not on file   Stress:     Feeling of Stress : Not on file   Social Connections:     Frequency of Communication with Friends and Family: Not on file    Frequency of Social Gatherings with Friends and Family: Not on file    Attends Confucianism Services: Not on file    Active Member of [address] [place] [address] [employer] or Organizations: Not on file    Attends Club or Organization Meetings: Not on file    Marital Status: Not on file   Intimate Partner Violence:     Fear of Current or Ex-Partner: Not on file    Emotionally Abused: Not on file    Physically Abused: Not on file    Sexually Abused: Not on file   Housing Stability:     Unable to Pay for Housing in the Last Year: Not on file    Number of [place] in the Last Year: Not on file    Unstable Housing in the Last Year: Not on file       ROS     Positive for: Musculoskeletal    Last edited by Aldo Esparza on 3/24/2022  2:11 PM. (History)            Vitals:  Ht 6' 4\" (1.93 m)   Wt 285 lb (129.3 kg)   BMI 34.69 kg/m²    Body mass index is 34.69 kg/m².     PHYSICAL EXAM:  Right lower extremity exam: Hip exam today reveals negative logroll test and negative impingement test.  Hip range of motion is full extension to 115 degrees of flexion. There is no trochanteric tenderness. Hip flexors and abductors have 5 x 5 strength. Patient able to stand on the affected lower extremity with negative Trendelenburg. Extremity has intact quads, ankle plantar flexors, ankle dorsiflexors. Skin is intact. Foot is sensate and well perfused. Straight leg raise and femoral nerve stretch test are negative. Left lower extremity exam: Hip exam today reveals negative logroll test and negative impingement test.  Hip range of motion is full extension to 115 degrees of flexion. There is no trochanteric tenderness. Hip flexors and abductors have 5 x 5 strength. Patient able to stand on the affected lower extremity with negative Trendelenburg. Extremity has intact quads, ankle plantar flexors, ankle dorsiflexors. Skin is intact. Foot is sensate and well perfused. Straight leg raise and femoral nerve stretch test are negative. IMAGING:  X-rays that the patient had obtained dated February 25, 2022 were reviewed. Left hip 2 views AP and lateral.  He has a pistol- deformity of his left hip on the AP with well-maintained joint space no cyst formation no os acetabulum on the lateral view as abnormal alpha angle with cam lesion at the head neck junction. ASSESSMENT/PLAN:  1. Femoroacetabular impingement with osteophyte of left hip  2. Left hip pain    I discussed the patient's findings and exam.  After long discussion we have decided to observe the problem. He is not limited by it now. We discussed things that he should not do including deep lunges and squats and things that would be okay including any upright activities where he avoids deep bending at the hip. All of his questions were answered. The next step as his symptoms progress would be an MRI to assess his acetabular labrum and cartilage as well as evaluate for other things that could is achiness in his hip.   All questions were answered he will follow-up as needed. He is having pain or discomfort it is okay to use over-the-counter anti-inflammatories. An electronic signature was used to authenticate this note.   --Carolann Suazo MD

## 2022-06-01 DIAGNOSIS — F41.9 ANXIETY: ICD-10-CM

## 2022-06-02 RX ORDER — SERTRALINE HYDROCHLORIDE 50 MG/1
TABLET, FILM COATED ORAL
Qty: 30 TABLET | Refills: 2 | Status: SHIPPED | OUTPATIENT
Start: 2022-06-02 | End: 2022-09-08

## 2022-07-14 NOTE — TELEPHONE ENCOUNTER
[facility] is requesting a new prescription on behalf of the pt via fax. Previous prescription was sent to [facility]. Pt has now established care with JOSEPH Valentin. Last visit 02/18/2022 JOSEPH Valentin   Next appointment Nothing scheduled   Previous refill encounter(s)   03/15/2022 Naprosyn #60     For Pharmacy Admin Tracking Only     Intervention Detail: New Rx: 1, reason: Patient Preference   Time Spent (min): 5      Requested Prescriptions     Pending Prescriptions Disp Refills    naproxen (NAPROSYN) 500 mg tablet 60 Tablet 0     Sig: Take 1 Tablet by mouth every twelve (12) hours as needed for Pain.  At first sign of migraine

## 2022-07-16 RX ORDER — NAPROXEN 500 MG/1
500 TABLET ORAL
Qty: 60 TABLET | Refills: 0 | Status: SHIPPED | OUTPATIENT
Start: 2022-07-16

## 2022-09-08 DIAGNOSIS — F41.9 ANXIETY: ICD-10-CM

## 2022-09-08 RX ORDER — SERTRALINE HYDROCHLORIDE 50 MG/1
TABLET, FILM COATED ORAL
Qty: 30 TABLET | Refills: 1 | Status: SHIPPED | OUTPATIENT
Start: 2022-09-08

## 2022-11-08 DIAGNOSIS — F41.9 ANXIETY: ICD-10-CM

## 2022-11-08 RX ORDER — SERTRALINE HYDROCHLORIDE 50 MG/1
TABLET, FILM COATED ORAL
Qty: 30 TABLET | Refills: 1 | Status: SHIPPED | OUTPATIENT
Start: 2022-11-08

## 2022-12-12 DIAGNOSIS — F41.9 ANXIETY: ICD-10-CM

## 2022-12-12 NOTE — TELEPHONE ENCOUNTER
Insurance requires a minimum fill for 90 days. If appropriate, please sign pended medication order. Last visit 02/18/12022 JOSEPH Suarez   Next appointment Nothing scheduled   Previous refill encounter(s)   11/08/2022 Zoloft #30 with 1 refill. For Pharmacy Admin Tracking Only    Intervention Detail: New Rx: 1, reason: Patient Preference  Time Spent (min): 5      Requested Prescriptions     Pending Prescriptions Disp Refills    sertraline (ZOLOFT) 50 mg tablet 90 Tablet 0     Sig: Take 1 Tablet by mouth daily.

## 2022-12-13 RX ORDER — SERTRALINE HYDROCHLORIDE 50 MG/1
50 TABLET, FILM COATED ORAL DAILY
Qty: 90 TABLET | Refills: 0 | Status: SHIPPED | OUTPATIENT
Start: 2022-12-13

## 2023-02-23 ENCOUNTER — VIRTUAL VISIT (OUTPATIENT)
Dept: INTERNAL MEDICINE CLINIC | Age: 28
End: 2023-02-23
Payer: COMMERCIAL

## 2023-02-23 DIAGNOSIS — R10.31 BILATERAL GROIN PAIN: Primary | ICD-10-CM

## 2023-02-23 DIAGNOSIS — R10.32 BILATERAL GROIN PAIN: Primary | ICD-10-CM

## 2023-02-23 PROCEDURE — 99213 OFFICE O/P EST LOW 20 MIN: CPT | Performed by: NURSE PRACTITIONER

## 2023-02-25 NOTE — PROGRESS NOTES
Subjective  Ghislaine Juarez is a 32 y.o. male. Ghislaine Juarez, who was evaluated through a synchronous (real-time) audio-video encounter, and/or his healthcare decision maker, is aware that it is a billable service, which includes applicable co-pays, with coverage as determined by his insurance carrier. He provided verbal consent to proceed and patient identification was verified. This visit was conducted pursuant to the emergency declaration under the [facility], [address] [place] [address] and the [email or web address] and [employer]. A caregiver was present when appropriate. Ability to conduct physical exam was limited. The patient was located at: Home: [place] [address]   [place] [id number]  The provider was located at: Facility ([facility] Department): [facility] [place] [phone]    --Areli Arrington NP on 2/25/2023 at 4:03 AM             HPI  Chief Complaint   Patient presents with    Groin Pain      He reports bilateral groin pain. This has been  [place] lingering issue\"  He has xray of hips and lower back and abdominal US > 1 year ago; these were unrevealing   Pain radiates down anterior leg from groin   Pain is always there. Feels like muscle soreness after cramp  If standing upper leg and groin hurt   After he has been moving for a while he gets pretty bad pain  Pain is worse when he first gets OOB  He feels better with stretching     Current medication effective for anxiety    Past Medical History:   Diagnosis Date    Headache     Migraine with aura and without status migrainosus, not intractable 8/3/2018        No Known Allergies     No past surgical history on file. Current Outpatient Medications   Medication Sig    sertraline (ZOLOFT) 50 mg tablet Take 1 Tablet by mouth daily. naproxen (NAPROSYN) 500 mg tablet Take 1 Tablet by mouth every twelve (12) hours as needed for Pain.  At first sign of migraine    SUMAtriptan (IMITREX) 50 mg tablet Take 1 Tablet by mouth daily as needed for Migraine. May repeat dose in 2 hours if symptoms not better. MAX in 24 hours: 200mg     No current facility-administered medications for this visit. Review of Systems   Constitutional:  Negative for malaise/fatigue. Respiratory: Negative. Gastrointestinal: Negative. Genitourinary: Negative. Musculoskeletal:  Positive for back pain and myalgias. Negative for falls. Skin: Negative. Neurological:  Negative for tingling, sensory change and focal weakness. Objective  Physical Exam  Objective:   Vital Signs: (As obtained by patient/caregiver at home)  There were no vitals taken for this visit.      [INSTRUCTIONS:  \"[x]\" Indicates a positive item  \"[]\" Indicates a negative item  -- DELETE ALL ITEMS NOT EXAMINED]    Constitutional: [x] Appears well-developed and well-nourished [x] No apparent distress      [] Abnormal -     Mental status: [x] Alert and awake  [x] Oriented to person/place/time [x] Able to follow commands    [] Abnormal -     Eyes:   EOM    [x]  Normal    [] Abnormal -   Sclera  [x]  Normal    [] Abnormal -          Discharge [x]  None visible   [] Abnormal -     HENT: [x] Normocephalic, atraumatic  [] Abnormal -   [x] Mouth/Throat: Mucous membranes are moist    External Ears [x] Normal  [] Abnormal -    Neck: [x] No visualized mass [] Abnormal -     Pulmonary/Chest: [x] Respiratory effort normal   [x] No visualized signs of difficulty breathing or respiratory distress        [] Abnormal -      Musculoskeletal:   [x] Normal gait with no signs of ataxia         [x] Normal range of motion of neck        [] Abnormal -     Neurological:        [x] No Facial Asymmetry (Cranial nerve 7 motor function) (limited exam due to video visit)          [x] No gaze palsy        [] Abnormal -          Skin:        [x] No significant exanthematous lesions or discoloration noted on facial skin         [] Abnormal - Psychiatric:       [x] Normal Affect [] Abnormal -        [x] No Hallucinations    Other pertinent observable physical exam findings:-      Assessment & Plan    ICD-10-CM ICD-9-CM    1. Bilateral groin pain  R10.31 789.03 US SCROTUM/TESTICLES    R10.32 789.04         Diagnoses and all orders for this visit:    1. Bilateral groin pain  -     US SCROTUM/TESTICLES; Future      Follow-up and Dispositions    Return if symptoms worsen or fail to improve.        Discussed need for Ortho evaluation if US unrevealing   Sandra Salazar NP

## 2023-03-09 DIAGNOSIS — F41.9 ANXIETY: ICD-10-CM

## 2023-03-10 RX ORDER — SERTRALINE HYDROCHLORIDE 50 MG/1
TABLET, FILM COATED ORAL
Qty: 90 TABLET | Refills: 0 | Status: SHIPPED | OUTPATIENT
Start: 2023-03-10

## 2023-03-20 ENCOUNTER — HOSPITAL ENCOUNTER (OUTPATIENT)
Dept: ULTRASOUND IMAGING | Age: 28
Discharge: HOME | End: 2023-03-20
Attending: NURSE PRACTITIONER
Payer: COMMERCIAL

## 2023-03-20 DIAGNOSIS — R10.32 BILATERAL GROIN PAIN: ICD-10-CM

## 2023-03-20 DIAGNOSIS — R10.31 BILATERAL GROIN PAIN: ICD-10-CM

## 2023-03-20 PROCEDURE — 76870 US EXAM SCROTUM: CPT

## 2023-06-08 DIAGNOSIS — F41.9 ANXIETY DISORDER, UNSPECIFIED: ICD-10-CM

## 2023-06-08 NOTE — TELEPHONE ENCOUNTER
Last appointment: 02/23/2023 Virtual visit BRANDYN Mason   Next appointment: Nothing scheduled   Previous refill encounter(s):   03/10/2023 Zoloft #90     For Pharmacy Admin Tracking Only    Program: Medication Refill  Intervention Detail: New Rx: 1, reason: Patient Preference  Time Spent (min): 5      Requested Prescriptions     Pending Prescriptions Disp Refills    sertraline (ZOLOFT) 50 MG tablet [Pharmacy Med Name: SERTRALINE HCL 50 MG TABLET] 90 tablet 0     Sig: TAKE 1 TABLET BY MOUTH EVERY DAY

## 2023-06-16 ENCOUNTER — PATIENT MESSAGE (OUTPATIENT)
Age: 28
End: 2023-06-16

## 2023-06-16 DIAGNOSIS — R10.30 LOWER ABDOMINAL PAIN: ICD-10-CM

## 2023-06-16 DIAGNOSIS — N50.3 EPIDIDYMAL CYST: ICD-10-CM

## 2023-06-16 DIAGNOSIS — I86.1 VARICOCELE: Primary | ICD-10-CM

## 2024-03-12 DIAGNOSIS — F41.9 ANXIETY DISORDER, UNSPECIFIED: ICD-10-CM

## 2024-03-12 NOTE — TELEPHONE ENCOUNTER
Please contact patient for scheduling. Thanks    Last appointment: 02/23/2023 Virtual visit BRANDYN Avendaño   Next appointment: Nothing scheduled   Previous refill encounter(s):   06/10/2023 Zoloft #90    For Pharmacy Admin Tracking Only    Program: Medication Refill  Intervention Detail: New Rx: 1, reason: Patient Preference  Time Spent (min): 5    Requested Prescriptions     Pending Prescriptions Disp Refills    sertraline (ZOLOFT) 50 MG tablet 30 tablet 0     Sig: Take 1 tablet by mouth daily       ----- Message from Candy Sosa sent at 3/12/2024 12:46 PM EDT -----  Subject: Refill Request    QUESTIONS  Name of Medication? sertraline (ZOLOFT) 50 MG tablet  Patient-reported dosage and instructions? once a day, 50mg  How many days do you have left? 0  Preferred Pharmacy? [id number]  Pharmacy phone number (if available)? [phone]  Additional Information for Provider? Pt provider since left practice   (Bree Avendaño). Needing refill on medication. Please advise  ---------------------------------------------------------------------------  --------------  CALL BACK INFO  What is the best way for the office to contact you? OK to leave message on   voicemail  Preferred Call Back Phone Number? [phone]  ---------------------------------------------------------------------------  --------------  SCRIPT ANSWERS  Relationship to Patient? Self